# Patient Record
Sex: MALE | Race: WHITE | URBAN - METROPOLITAN AREA
[De-identification: names, ages, dates, MRNs, and addresses within clinical notes are randomized per-mention and may not be internally consistent; named-entity substitution may affect disease eponyms.]

---

## 2017-01-01 ENCOUNTER — TELEPHONE (OUTPATIENT)
Dept: FAMILY MEDICINE | Facility: CLINIC | Age: 57
End: 2017-01-01

## 2017-01-01 ENCOUNTER — MEDICAL CORRESPONDENCE (OUTPATIENT)
Dept: HEALTH INFORMATION MANAGEMENT | Facility: CLINIC | Age: 57
End: 2017-01-01

## 2017-01-01 ENCOUNTER — TRANSFERRED RECORDS (OUTPATIENT)
Dept: HEALTH INFORMATION MANAGEMENT | Facility: CLINIC | Age: 57
End: 2017-01-01

## 2017-01-01 ENCOUNTER — TELEPHONE (OUTPATIENT)
Dept: CARE COORDINATION | Facility: CLINIC | Age: 57
End: 2017-01-01

## 2017-01-01 ENCOUNTER — OFFICE VISIT (OUTPATIENT)
Dept: FAMILY MEDICINE | Facility: CLINIC | Age: 57
End: 2017-01-01
Payer: COMMERCIAL

## 2017-01-01 ENCOUNTER — DOCUMENTATION ONLY (OUTPATIENT)
Dept: LAB | Facility: CLINIC | Age: 57
End: 2017-01-01

## 2017-01-01 ENCOUNTER — CARE COORDINATION (OUTPATIENT)
Dept: CARE COORDINATION | Facility: CLINIC | Age: 57
End: 2017-01-01

## 2017-01-01 VITALS
HEART RATE: 83 BPM | SYSTOLIC BLOOD PRESSURE: 113 MMHG | WEIGHT: 183 LBS | HEIGHT: 72 IN | DIASTOLIC BLOOD PRESSURE: 89 MMHG | OXYGEN SATURATION: 99 % | BODY MASS INDEX: 24.79 KG/M2 | TEMPERATURE: 97.6 F

## 2017-01-01 VITALS
DIASTOLIC BLOOD PRESSURE: 60 MMHG | WEIGHT: 180 LBS | TEMPERATURE: 97.5 F | HEART RATE: 131 BPM | SYSTOLIC BLOOD PRESSURE: 96 MMHG | BODY MASS INDEX: 24.31 KG/M2 | OXYGEN SATURATION: 90 %

## 2017-01-01 VITALS
SYSTOLIC BLOOD PRESSURE: 116 MMHG | WEIGHT: 175 LBS | HEART RATE: 103 BPM | OXYGEN SATURATION: 98 % | TEMPERATURE: 97 F | BODY MASS INDEX: 23.64 KG/M2 | DIASTOLIC BLOOD PRESSURE: 83 MMHG

## 2017-01-01 VITALS
BODY MASS INDEX: 24.36 KG/M2 | SYSTOLIC BLOOD PRESSURE: 116 MMHG | WEIGHT: 180.38 LBS | DIASTOLIC BLOOD PRESSURE: 85 MMHG | HEART RATE: 104 BPM | OXYGEN SATURATION: 99 % | TEMPERATURE: 97.9 F

## 2017-01-01 DIAGNOSIS — Z01.818 PREOP GENERAL PHYSICAL EXAM: Primary | ICD-10-CM

## 2017-01-01 DIAGNOSIS — R53.1 WEAKNESS: ICD-10-CM

## 2017-01-01 DIAGNOSIS — Z46.89 ENCOUNTER FOR REPLACEMENT OF PANCREATIC STENT: ICD-10-CM

## 2017-01-01 DIAGNOSIS — M21.371 RIGHT FOOT DROP: ICD-10-CM

## 2017-01-01 DIAGNOSIS — E83.42 HYPOMAGNESEMIA: ICD-10-CM

## 2017-01-01 DIAGNOSIS — I25.10 CORONARY ARTERY DISEASE INVOLVING NATIVE CORONARY ARTERY OF NATIVE HEART WITHOUT ANGINA PECTORIS: ICD-10-CM

## 2017-01-01 DIAGNOSIS — I25.10 CORONARY ARTERY DISEASE WITHOUT ANGINA PECTORIS, UNSPECIFIED VESSEL OR LESION TYPE, UNSPECIFIED WHETHER NATIVE OR TRANSPLANTED HEART: Primary | ICD-10-CM

## 2017-01-01 DIAGNOSIS — C79.89: ICD-10-CM

## 2017-01-01 DIAGNOSIS — I25.10 CORONARY ARTERY DISEASE WITHOUT ANGINA PECTORIS, UNSPECIFIED VESSEL OR LESION TYPE, UNSPECIFIED WHETHER NATIVE OR TRANSPLANTED HEART: ICD-10-CM

## 2017-01-01 DIAGNOSIS — A41.9 SEVERE SEPSIS (H): Primary | ICD-10-CM

## 2017-01-01 DIAGNOSIS — R65.21 SEPTIC SHOCK (H): Primary | ICD-10-CM

## 2017-01-01 DIAGNOSIS — R11.0 NAUSEA: ICD-10-CM

## 2017-01-01 DIAGNOSIS — Z86.19 HISTORY OF SEPSIS: ICD-10-CM

## 2017-01-01 DIAGNOSIS — C34.90: ICD-10-CM

## 2017-01-01 DIAGNOSIS — A41.9 SEPTIC SHOCK (H): Primary | ICD-10-CM

## 2017-01-01 DIAGNOSIS — D64.9 ANEMIA, UNSPECIFIED TYPE: ICD-10-CM

## 2017-01-01 DIAGNOSIS — K59.00 CONSTIPATION, UNSPECIFIED CONSTIPATION TYPE: ICD-10-CM

## 2017-01-01 DIAGNOSIS — I10 HYPERTENSION GOAL BP (BLOOD PRESSURE) < 140/90: ICD-10-CM

## 2017-01-01 DIAGNOSIS — A41.51 E. COLI SEPTICEMIA (H): Primary | ICD-10-CM

## 2017-01-01 DIAGNOSIS — A41.9 BACTERIAL SEPSIS (H): Primary | ICD-10-CM

## 2017-01-01 DIAGNOSIS — R65.20 SEVERE SEPSIS (H): Primary | ICD-10-CM

## 2017-01-01 DIAGNOSIS — K83.09 ACUTE CHOLANGITIS (H): ICD-10-CM

## 2017-01-01 DIAGNOSIS — E87.6 HYPOKALEMIA: ICD-10-CM

## 2017-01-01 DIAGNOSIS — D49.6 BRAIN TUMOR (H): ICD-10-CM

## 2017-01-01 DIAGNOSIS — C34.90 METASTATIC LUNG CANCER (METASTASIS FROM LUNG TO OTHER SITE), UNSPECIFIED LATERALITY (H): Primary | ICD-10-CM

## 2017-01-01 LAB
ALBUMIN SERPL-MCNC: 3.1 G/DL (ref 3.4–5)
ALBUMIN SERPL-MCNC: 3.8 G/DL (ref 3.4–5)
ALP SERPL-CCNC: 103 U/L (ref 40–150)
ALP SERPL-CCNC: 114 U/L (ref 40–150)
ALT SERPL W P-5'-P-CCNC: 41 U/L (ref 0–70)
ALT SERPL W P-5'-P-CCNC: 72 U/L (ref 0–70)
ANION GAP SERPL CALCULATED.3IONS-SCNC: 11 MMOL/L (ref 3–14)
ANION GAP SERPL CALCULATED.3IONS-SCNC: 11 MMOL/L (ref 3–14)
AST SERPL W P-5'-P-CCNC: 16 U/L (ref 0–45)
AST SERPL W P-5'-P-CCNC: 32 U/L (ref 0–45)
BASOPHILS # BLD AUTO: 0 10E9/L (ref 0–0.2)
BASOPHILS # BLD AUTO: 0 10E9/L (ref 0–0.2)
BASOPHILS NFR BLD AUTO: 0.4 %
BASOPHILS NFR BLD AUTO: 0.4 %
BILIRUB SERPL-MCNC: 0.5 MG/DL (ref 0.2–1.3)
BILIRUB SERPL-MCNC: 0.5 MG/DL (ref 0.2–1.3)
BUN SERPL-MCNC: 14 MG/DL (ref 7–30)
BUN SERPL-MCNC: 21 MG/DL (ref 7–30)
CALCIUM SERPL-MCNC: 10.4 MG/DL (ref 8.5–10.1)
CALCIUM SERPL-MCNC: 9.4 MG/DL (ref 8.5–10.1)
CHLORIDE SERPL-SCNC: 97 MMOL/L (ref 94–109)
CHLORIDE SERPL-SCNC: 98 MMOL/L (ref 94–109)
CO2 SERPL-SCNC: 26 MMOL/L (ref 20–32)
CO2 SERPL-SCNC: 27 MMOL/L (ref 20–32)
CREAT SERPL-MCNC: 0.53 MG/DL (ref 0.72–1.25)
CREAT SERPL-MCNC: 0.7 MG/DL (ref 0.66–1.25)
CREAT SERPL-MCNC: 0.75 MG/DL (ref 0.66–1.25)
DIFFERENTIAL METHOD BLD: ABNORMAL
DIFFERENTIAL METHOD BLD: ABNORMAL
EOSINOPHIL # BLD AUTO: 0 10E9/L (ref 0–0.7)
EOSINOPHIL # BLD AUTO: 0 10E9/L (ref 0–0.7)
EOSINOPHIL NFR BLD AUTO: 0.2 %
EOSINOPHIL NFR BLD AUTO: 0.2 %
ERYTHROCYTE [DISTWIDTH] IN BLOOD BY AUTOMATED COUNT: 18.9 % (ref 10–15)
ERYTHROCYTE [DISTWIDTH] IN BLOOD BY AUTOMATED COUNT: 21.7 % (ref 10–15)
GFR SERPL CREATININE-BSD FRML MDRD: >60 ML/MIN/1.73M2
GFR SERPL CREATININE-BSD FRML MDRD: >90 ML/MIN/1.7M2
GFR SERPL CREATININE-BSD FRML MDRD: ABNORMAL ML/MIN/1.7M2
GLUCOSE SERPL-MCNC: 115 MG/DL (ref 70–99)
GLUCOSE SERPL-MCNC: 142 MG/DL (ref 70–99)
HCT VFR BLD AUTO: 28.1 % (ref 40–53)
HCT VFR BLD AUTO: 31.7 % (ref 40–53)
HGB BLD-MCNC: 10.8 G/DL (ref 13.3–17.7)
HGB BLD-MCNC: 9.3 G/DL (ref 13.3–17.7)
LYMPHOCYTES # BLD AUTO: 2.5 10E9/L (ref 0.8–5.3)
LYMPHOCYTES # BLD AUTO: 3.9 10E9/L (ref 0.8–5.3)
LYMPHOCYTES NFR BLD AUTO: 38.4 %
LYMPHOCYTES NFR BLD AUTO: 49.3 %
MAGNESIUM SERPL-MCNC: 1.5 MG/DL (ref 1.6–2.3)
MCH RBC QN AUTO: 32.4 PG (ref 26.5–33)
MCH RBC QN AUTO: 35.6 PG (ref 26.5–33)
MCHC RBC AUTO-ENTMCNC: 33.1 G/DL (ref 31.5–36.5)
MCHC RBC AUTO-ENTMCNC: 34.1 G/DL (ref 31.5–36.5)
MCV RBC AUTO: 105 FL (ref 78–100)
MCV RBC AUTO: 98 FL (ref 78–100)
MONOCYTES # BLD AUTO: 0.1 10E9/L (ref 0–1.3)
MONOCYTES # BLD AUTO: 0.9 10E9/L (ref 0–1.3)
MONOCYTES NFR BLD AUTO: 1.8 %
MONOCYTES NFR BLD AUTO: 9.1 %
NEUTROPHILS # BLD AUTO: 2.5 10E9/L (ref 1.6–8.3)
NEUTROPHILS # BLD AUTO: 5.3 10E9/L (ref 1.6–8.3)
NEUTROPHILS NFR BLD AUTO: 48.3 %
NEUTROPHILS NFR BLD AUTO: 51.9 %
PLATELET # BLD AUTO: 151 10E9/L (ref 150–450)
PLATELET # BLD AUTO: 73 10E9/L (ref 150–450)
POTASSIUM SERPL-SCNC: 3.4 MMOL/L (ref 3.5–5)
POTASSIUM SERPL-SCNC: 4 MMOL/L (ref 3.4–5.3)
POTASSIUM SERPL-SCNC: 4.7 MMOL/L (ref 3.4–5.3)
PROT SERPL-MCNC: 7.4 G/DL (ref 6.8–8.8)
PROT SERPL-MCNC: 8.1 G/DL (ref 6.8–8.8)
RBC # BLD AUTO: 2.87 10E12/L (ref 4.4–5.9)
RBC # BLD AUTO: 3.03 10E12/L (ref 4.4–5.9)
SODIUM SERPL-SCNC: 134 MMOL/L (ref 133–144)
SODIUM SERPL-SCNC: 136 MMOL/L (ref 133–144)
WBC # BLD AUTO: 10.2 10E9/L (ref 4–11)
WBC # BLD AUTO: 5.1 10E9/L (ref 4–11)

## 2017-01-01 PROCEDURE — 99214 OFFICE O/P EST MOD 30 MIN: CPT | Performed by: FAMILY MEDICINE

## 2017-01-01 PROCEDURE — 36415 COLL VENOUS BLD VENIPUNCTURE: CPT | Performed by: FAMILY MEDICINE

## 2017-01-01 PROCEDURE — 83735 ASSAY OF MAGNESIUM: CPT | Performed by: FAMILY MEDICINE

## 2017-01-01 PROCEDURE — 80053 COMPREHEN METABOLIC PANEL: CPT | Performed by: FAMILY MEDICINE

## 2017-01-01 PROCEDURE — 85025 COMPLETE CBC W/AUTO DIFF WBC: CPT | Performed by: FAMILY MEDICINE

## 2017-01-01 PROCEDURE — 99215 OFFICE O/P EST HI 40 MIN: CPT | Performed by: FAMILY MEDICINE

## 2017-01-01 RX ORDER — GABAPENTIN 300 MG/1
300 CAPSULE ORAL
COMMUNITY

## 2017-01-01 RX ORDER — LIDOCAINE/PRILOCAINE 2.5 %-2.5%
CREAM (GRAM) TOPICAL
COMMUNITY
Start: 2017-01-01

## 2017-01-01 RX ORDER — LORATADINE 10 MG/1
10 TABLET ORAL
COMMUNITY

## 2017-01-01 RX ORDER — NITROGLYCERIN 0.4 MG/1
0.4 TABLET SUBLINGUAL EVERY 5 MIN PRN
Qty: 25 TABLET | Refills: 1 | Status: SHIPPED | OUTPATIENT
Start: 2017-01-01

## 2017-01-01 RX ORDER — CYANOCOBALAMIN 1000 UG/ML
1 INJECTION, SOLUTION INTRAMUSCULAR; SUBCUTANEOUS
COMMUNITY

## 2017-01-01 RX ORDER — PROCHLORPERAZINE MALEATE 10 MG
10 TABLET ORAL
COMMUNITY

## 2017-01-01 RX ORDER — FOLIC ACID 1 MG/1
1 TABLET ORAL DAILY
COMMUNITY

## 2017-01-01 RX ORDER — PANTOPRAZOLE SODIUM 40 MG/1
40 TABLET, DELAYED RELEASE ORAL
COMMUNITY

## 2017-01-01 RX ORDER — HYDROMORPHONE HYDROCHLORIDE 2 MG/1
2 TABLET ORAL
COMMUNITY
Start: 2017-01-01

## 2017-01-01 RX ORDER — NITROGLYCERIN 0.4 MG/1
0.4 TABLET SUBLINGUAL EVERY 5 MIN PRN
Qty: 25 TABLET | Refills: 1 | Status: SHIPPED | OUTPATIENT
Start: 2017-01-01 | End: 2017-01-01

## 2017-01-01 RX ORDER — SULFAMETHOXAZOLE/TRIMETHOPRIM 800-160 MG
1 TABLET ORAL 2 TIMES DAILY
Qty: 14 TABLET | Refills: 0 | Status: SHIPPED | OUTPATIENT
Start: 2017-01-01 | End: 2017-01-01

## 2017-01-01 ASSESSMENT — PATIENT HEALTH QUESTIONNAIRE - PHQ9
SUM OF ALL RESPONSES TO PHQ QUESTIONS 1-9: 9
SUM OF ALL RESPONSES TO PHQ QUESTIONS 1-9: 21

## 2017-01-01 ASSESSMENT — PAIN SCALES - GENERAL
PAINLEVEL: NO PAIN (0)

## 2017-03-01 NOTE — LETTER
March 1, 2017    Yunier Hernandez  [address] [facility] [address]    Dear Yunier    We care about your health and have reviewed your health plan. We have reviewed your medical conditions, medication list, and lab results and are making recommendations based on this review, to better manage your health.    You are in particular need of attention regarding:  - Completing a Colon Cancer Screening (FIT) - Please complete FIT test which we can mail to you to complete and mail completed test to clinic.      Here is a list of Health Maintenance topics that are due now or due soon:  Health Maintenance Due   Topic Date Due     FIT Q1 YR (NO INBASKET)  04/13/1972     HEPATITIS C SCREENING  04/13/1980     We will be calling you in the next couple of weeks to help you schedule any appointments that are needed.  Please call us at [phone] (or use [employer]) to address the above recommendations.     Thank you for trusting [facility] and we appreciate the opportunity to serve you.  We look forward to supporting your healthcare needs in the future.    Healthy Regards,    MD Shelly Cruz CMA

## 2017-03-01 NOTE — LETTER
March 27, 2017    Yunier Hernandez  [address] [facility] [address]      Dear Yunier Hernandez,     We have tried to contact you about your health, but have been unable to reach you.  Please call us as soon as possible so we can provide you with the best care possible.  We will continue to check in with you throughout the year to complete these items of care, if you are not able to complete these items at this time.  If you would like to complete the missing items for your care, please contact us at [phone].    We recommend the following:  -complete a FIT TEST a FIT test or Fecal Immunochemical Occult Blood Test is a take home stool sample kit.  It does not replace the colonoscopy for colorectal cancer screening, but it can detect hidden bleeding in the lower colon.  It does need to be repeated every year and if a positive result is obtained, you would be referred for a colonoscopy.  If you have completed either one of these tests at another facility, please have the records sent to our clinic so that we can best coordinate your care.    Sincerely,     Your Care Team at [facility]

## 2017-03-17 NOTE — TELEPHONE ENCOUNTER
I called and left  Message for patient to call back regarding mailing him a fit test to complete.  Shelly Ahumada CMA

## 2017-03-27 NOTE — TELEPHONE ENCOUNTER
I called and left message for patient to call back regarding mailing him a fit test to complete. Final letter mailed  Shelly Ahumada CMA

## 2017-03-29 NOTE — TELEPHONE ENCOUNTER
Patient's wife Radha returned phone call to Katia. TC checked for a consent to communicate on file and we do not have one on file. Radha stated that if we are calling regarding the letter we received for patient to have FIT test that it was not recommended to patient to have done at this time. Patient was recently hospitalized for an extended period and it was determined that his cancer is back. They found cancer in the pancreas, stomach, and spine due to lung mets. Radha asked while patient was in the hospital about getting a stool sample for this FIT test and they told her that the patient has had multiple scans and this is not something that needs to be addressed right now under the circumstances. TC will also route to provider as an FYI.

## 2017-04-21 PROBLEM — C79.89: Status: ACTIVE | Noted: 2017-01-01

## 2017-04-21 PROBLEM — C34.90: Status: ACTIVE | Noted: 2017-01-01

## 2017-04-21 NOTE — NURSING NOTE
"Chief Complaint   Patient presents with     [facility] F/U     Health Maintenance       Initial /89 (BP Location: Right arm, Patient Position: Chair, Cuff Size: Adult Regular)  Pulse 83  Temp 97.6  F (36.4  C) (Oral)  Ht 6' 0.15\" (1.833 m)  Wt 183 lb (83 kg)  SpO2 99%  BMI 24.72 kg/m2 Estimated body mass index is 24.72 kg/(m^2) as calculated from the following:    Height as of this encounter: 6' 0.15\" (1.833 m).    Weight as of this encounter: 183 lb (83 kg).  Medication Reconciliation: complete   Shelly Ahumada CMA      "

## 2017-04-21 NOTE — MR AVS SNAPSHOT
"              After Visit Summary   4/21/2017    Yunier Hernandez    MRN: [id number]           Patient Information     Date Of Birth          1960        Visit Information        Provider Department      4/21/2017 4:20 PM Karson Mahmood MD [facility]        Today's Diagnoses     Coronary artery disease without angina pectoris, unspecified vessel or lesion type, unspecified whether native or transplanted heart    -  1      Care Instructions    Stay well hydrated    Good calorie and protein intake    Call with problems/ questions        Follow-ups after your visit        Who to contact     If you have questions or need follow up information about today's clinic visit or your schedule please contact [facility] directly at [phone].  Normal or non-critical lab and imaging results will be communicated to you by MyChart, letter or phone within 4 business days after the clinic has received the results. If you do not hear from us within 7 days, please contact the clinic through MyChart or phone. If you have a critical or abnormal lab result, we will notify you by phone as soon as possible.  Submit refill requests through [employer] or call your pharmacy and they will forward the refill request to us. Please allow 3 business days for your refill to be completed.          Additional Information About Your Visit        MyChart Information     [employer] lets you send messages to your doctor, view your test results, renew your prescriptions, schedule appointments and more. To sign up, go to [email or web address] . Click on \"Log in\" on the left side of the screen, which will take you to the Welcome page. Then click on \"Sign up Now\" on the right side of the page.     You will be asked to enter the access code listed below, as well as some personal information. Please follow the directions to create your username and password.     Your access code is: [email or web address]  Expires: " "2017  5:06 PM     Your access code will  in 90 days. If you need help or a new code, please call your [facility] clinic or [phone].        Care EveryWhere ID     This is your Care EveryWhere ID. This could be used by other organizations to access your [facility] medical records  [id number]        Your Vitals Were     Pulse Temperature Height Pulse Oximetry BMI (Body Mass Index)       83 97.6  F (36.4  C) (Oral) 6' 0.15\" (1.833 m) 99% 24.72 kg/m2        Blood Pressure from Last 3 Encounters:   17 113/89   16 119/83   16 110/84    Weight from Last 3 Encounters:   17 183 lb (83 kg)   16 197 lb (89.4 kg)   16 210 lb (95.3 kg)              Today, you had the following     No orders found for display         Today's Medication Changes          These changes are accurate as of: 17  5:06 PM.  If you have any questions, ask your nurse or doctor.               These medicines have changed or have updated prescriptions.        Dose/Directions    nitroglycerin 0.4 MG sublingual tablet   Commonly known as:  NITROSTAT   This may have changed:    - when to take this  - reasons to take this   Used for:  Coronary artery disease without angina pectoris, unspecified vessel or lesion type, unspecified whether native or transplanted heart   Changed by:  Karson Mahmood MD        Dose:  0.4 mg   Place 1 tablet (0.4 mg) under the tongue every 5 minutes as needed for chest pain   Quantity:  25 tablet   Refills:  1            Where to get your medicines      These medications were sent to [id number] - [place], [place] - [address] AT [facility] [address]  [address], [place] [address]     Phone:  [phone]     nitroglycerin 0.4 MG sublingual tablet                Primary Care Provider Office Phone # Fax #    Karson Mahmood [phone]       [facility] [address]  [facility] " [id number]        Thank you!     Thank you for choosing [facility]  for your care. Our goal is always to provide you with excellent care. Hearing back from our patients is one way we can continue to improve our services. Please take a few minutes to complete the written survey that you may receive in the mail after your visit with us. Thank you!             Your Updated Medication List - Protect others around you: Learn how to safely use, store and throw away your medicines at www.disposemymeds.org.          This list is accurate as of: 4/21/17  5:06 PM.  Always use your most recent med list.                   Brand Name Dispense Instructions for use    acetaminophen 325 MG tablet    TYLENOL     Take 650 mg by mouth       aspirin 81 MG chewable tablet      Take 81 mg by mouth       Dronabinol 10 MG Caps      Take 5 mg by mouth two times daily       gabapentin 300 MG capsule    NEURONTIN     Take 300 mg by mouth       HYDROmorphone 2 MG tablet    DILAUDID     Take 2 mg by mouth       lidocaine-prilocaine cream    EMLA     APPLY 1 APPLICATION TOPICALLY TO THE AFFECTED AREA AS DIRECTED       lisinopril 5 MG tablet    PRINIVIL/ZESTRIL    90 tablet    Take 1 tablet (5 mg) by mouth daily       loratadine 10 MG tablet    CLARITIN     Take 10 mg by mouth       melatonin 5 MG tablet      Take 10 mg by mouth       METAMUCIL PO      Take 1 packet by mouth 2 times daily       metoprolol 25 MG tablet    LOPRESSOR    180 tablet    Take 1 tablet (25 mg) by mouth 2 times daily       nitroglycerin 0.4 MG sublingual tablet    NITROSTAT    25 tablet    Place 1 tablet (0.4 mg) under the tongue every 5 minutes as needed for chest pain       omeprazole 40 MG capsule    priLOSEC    90 capsule    Take 40 mg by mouth daily Reported on 4/21/2017       pantoprazole 40 MG EC tablet    PROTONIX     Take 40 mg by mouth       polyethylene glycol powder    MIRALAX/GLYCOLAX     Take 17 g by mouth 2 times daily        prochlorperazine 10 MG tablet    COMPAZINE     Take 10 mg by mouth       SENEXON-S 8.6-50 MG per tablet   Generic drug:  senna-docusate     360 tablet    1-2 po bid prn constipation

## 2017-04-21 NOTE — PROGRESS NOTES
SUBJECTIVE:                                                    Yunier Hernandez is a 57 year old male who presents to clinic today for the following health issues:           Hospital Follow-up Visit:    Hospital/Nursing Home/IP Rehab Facility: [facility]  Date of Admission: 03/17/2017  Date of Discharge: 03/23/2017  Reason(s) for Admission: constipation            Problems taking medications regularly:  None       Medication changes since discharge: yes I updated in the system       Problems adhering to non-medication therapy:  None     Summary of hospitalization:  CareEverywhere information obtained and reviewed  Diagnostic Tests/Treatments reviewed.  Follow up needed: none  Other Healthcare Providers Involved in Patient s Care:         None  Update since discharge: improved.      Post Discharge Medication Reconciliation: discharge medications reconciled, continue medications without change.  Plan of care communicated with patient and family     Coding guidelines for this visit:  Type of Medical   Decision Making Face-to-Face Visit       within 7 Days of discharge Face-to-Face Visit        within 14 days of discharge   Moderate Complexity 08380 [id number]   High Complexity 78481 [id number]                 Problem list and histories reviewed & adjusted, as indicated.  Additional history: as documented         Reviewed and updated as needed this visit by clinical staff  Tobacco  Allergies  Meds  Problems  Med Hx  Surg Hx  Fam Hx  Soc Hx        Reviewed and updated as needed this visit by Provider            Just taking as needed pain meds now    Has the dilaudid    No nausea pill needed    Dilaudid 2mg    Takes one pill at night    Had been up to 3 per day     Going to meet with nutritionist    Hard to find a supplement he enjoys taste of     Senna 2 pills daily ( not senokot s )    miralax in am    Dulcolax pill 2x daily    Going to change regimen on the chemo    Has not worked since March 17     Not much heavy  lifting    Chemo every 3 weeks    To get new regimen next time    Physical Exam   Constitutional: He is oriented to person, place, and time and well-developed, well-nourished, and in no distress. No distress.   HENT:   Head: Normocephalic and atraumatic.   Eyes: Conjunctivae are normal.   Neck: Carotid bruit is not present.   Cardiovascular: Normal rate, regular rhythm, normal heart sounds and intact distal pulses.  Exam reveals no gallop and no friction rub.    No murmur heard.  Pulmonary/Chest: Effort normal and breath sounds normal. No respiratory distress. He has no wheezes. He has no rales.   Abdominal: Soft. There is no tenderness.   Musculoskeletal: He exhibits no edema.   Neurological: He is alert and oriented to person, place, and time.   Skin: He is not diaphoretic.   Psychiatric: Mood and affect normal.   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  Unfortunately patient found to have mets to pancreas.  Plan: per onc    (D49.6) Brain tumor (H)  Comment: history of treatment for this also   Plan: as above     (R11.0) Nausea  Comment: has prn med   Plan: as above     (K59.00) Constipation, unspecified constipation type  Comment: this is well controlled currently; good regimen of meds as outlined in note above   Plan: discussed in detail     (I10) Hypertension goal BP (blood pressure) < 140/90  Comment: at goal.  Patient now off lisinopril, still on beta blocker   Plan: as above     Patient can call / return to clinic as  needed       I reviewed the patient's medications, allergies, medical history, family history, and social history.    Karson Mahmood MD

## 2017-04-24 NOTE — TELEPHONE ENCOUNTER
Received message that NitroStat Rx failed to transmit to pharmacy.  Re-sent Rx.    Laurie Chicas RN  [facility], [place]

## 2017-06-19 PROBLEM — Z71.89 ADVANCED DIRECTIVES, COUNSELING/DISCUSSION: Status: ACTIVE | Noted: 2017-01-01

## 2017-06-19 NOTE — PATIENT INSTRUCTIONS
Before Your Surgery      Call your surgeon if there is any change in your health. This includes signs of a cold or flu (such as a sore throat, runny nose, cough, rash or fever).    Do not smoke, drink alcohol or take over the counter medicine (unless your surgeon or primary care doctor tells you to) for the 24 hours before and after surgery.    If you take prescribed drugs: Follow your doctor s orders about which medicines to take and which to stop until after surgery.    Eating and drinking prior to surgery: follow the instructions from your surgeon    Take a shower or bath the night before surgery. Use the soap your surgeon gave you to gently clean your skin. If you do not have soap from your surgeon, use your regular soap. Do not shave or scrub the surgery site.  Wear clean pajamas and have clean sheets on your bed.         Stay on aspirin this week    Hold meds morning of procedure except for metoprolol    We will send you lab results

## 2017-06-19 NOTE — PROGRESS NOTES
[address] [address]  [phone]  Dept: [phone]    PRE-OP EVALUATION:  Today's date: 2017    Yunier Hernandez (: 1960) presents for pre-operative evaluation assessment as requested by Dr. Sujata Jimenez.  He requires evaluation and anesthesia risk assessment prior to undergoing surgery/procedure for treatment of stent replacement .  Proposed procedure: stent replacement    Date of Surgery/ Procedure: 2017  Time of Surgery/ Procedure: 08:00 am  Hospital/Surgical Facility: [facility]  Fax number for surgical facility: [phone]  Primary Physician: Karson Mahmood  Type of Anesthesia Anticipated: General    Patient has a Health Care Directive or Living Will:  NO    1. YES - DO YOU HAVE A HISTORY OF HEART ATTACK, STROKE, STENT, BYPASS OR SURGERY ON AN ARTERY IN THE HEAD, NECK, HEART OR LEG? MI May 2015  2. YES - DO YOU EVER HAVE ANY PAIN OR DISCOMFORT IN YOUR CHEST? As above.  No current chest pain, just some stiffness.  3. NO - Do you have a history of  Heart Failure?  4. NO - Are you troubled by shortness of breath when: walking on the level, up a slight hill or at night?  5. NO - Do you currently have a cold, bronchitis or other respiratory infection?  6. YES - DO YOU HAVE A COUGH, SHORTNESS OF BREATH OR WHEEZING? No change recently in the breathing  7. YES - DO YOU SOMETIMES GET PAINS IN THE CALVES OF YOUR LEGS WHEN YOU WALK? Not changing.  Kind of a shaking sensation.  8. NO - Do you or anyone in your family have previous history of blood clots?  9. NO - Do you or does anyone in your family have a serious bleeding problem such as prolonged bleeding following surgeries or cuts?  10. NO - Have you ever had problems with anemia or been told to take iron pills?  11. NO - Have you had any abnormal blood loss such as black, tarry or bloody stools, or abnormal vaginal bleeding?  12. NO - Have you ever had a blood  transfusion?  13. NO - Have you or any of your relatives ever had problems with anesthesia?  14. NO - Do you have sleep apnea, excessive snoring or daytime drowsiness?  15. NO - Do you have any prosthetic heart valves?  16. NO - Do you have prosthetic joints?  17. NO - Is there any chance that you may be pregnant?      HPI:                                                      Brief HPI related to upcoming procedure:  Patient has a pancreatic stent placed about 3 months ago.  Now they are to replace it.    Patient had spread of the lung cancer to the pancreas.           MEDICAL HISTORY:                                                      Patient Active Problem List    Diagnosis Date Noted     Advanced directives, counseling/discussion 06/19/2017     Priority: Medium     Non-small cell carcinoma of lung metastatic to abdomen, unspecified laterality (H) 04/21/2017     Priority: Medium     History of tobacco abuse 11/11/2016     Priority: Medium     Coronary artery disease without angina pectoris, unspecified vessel or lesion type, unspecified whether native or transplanted heart 12/31/2015     Priority: Medium     Lung tumor 12/31/2015     Priority: Medium     Brain tumor (H) 12/31/2015     Priority: Medium     Major depressive disorder, recurrent episode, moderate (H) 12/16/2015     Priority: Medium     Hyperlipidemia LDL goal <70 06/26/2015     Priority: Medium     Hypertension goal BP (blood pressure) < 140/90 06/26/2015     Priority: Medium     Coronary artery disease involving native coronary artery without angina pectoris 05/29/2015     Priority: Medium      Past Medical History:   Diagnosis Date     Lung cancer (H) Dec 2015     ST elevation (STEMI) myocardial infarction (H) May 2015    Myocardial Infarction     Past Surgical History:   Procedure Laterality Date     ANGIOPLASTY  May 2015    one coronary stent     Current Outpatient Prescriptions   Medication Sig Dispense Refill     DiphenhydrAMINE HCl (BENADRYL  PO)        Simethicone (GAS-X PO)        nitroglycerin (NITROSTAT) 0.4 MG sublingual tablet Place 1 tablet (0.4 mg) under the tongue every 5 minutes as needed for chest pain 25 tablet 1     HYDROmorphone (DILAUDID) 2 MG tablet Take 2 mg by mouth       gabapentin (NEURONTIN) 300 MG capsule Take 300 mg by mouth       lidocaine-prilocaine (EMLA) cream APPLY 1 APPLICATION TOPICALLY TO THE AFFECTED AREA AS DIRECTED       loratadine (CLARITIN) 10 MG tablet Take 10 mg by mouth       melatonin 5 MG tablet Take 10 mg by mouth       pantoprazole (PROTONIX) 40 MG EC tablet Take 40 mg by mouth       prochlorperazine (COMPAZINE) 10 MG tablet Take 10 mg by mouth       metoprolol (LOPRESSOR) 25 MG tablet Take 1 tablet (25 mg) by mouth 2 times daily (Patient taking differently: Take 25 mg by mouth 2 times daily Takes 0.5 tablet twice daily) 180 tablet 1     Dronabinol 10 MG CAPS Take 5 mg by mouth two times daily       SENEXON-S 8.6-50 MG per tablet 1-2 po bid prn constipation 360 tablet 1     polyethylene glycol (MIRALAX/GLYCOLAX) powder Take 17 g by mouth 2 times daily       aspirin 81 MG chewable tablet Take 81 mg by mouth       acetaminophen (TYLENOL) 325 MG tablet Take 650 mg by mouth       lisinopril (PRINIVIL/ZESTRIL) 5 MG tablet TAKE 1 TABLET(5 MG) BY MOUTH DAILY (Patient not taking: Reported on 6/19/2017) 90 tablet 0     Psyllium (METAMUCIL PO) Take 1 packet by mouth 2 times daily     also pancreatic enzymes  Note patient not taking the lisinopril currently    OTC products: occasional over the counter sleep aid    No Known Allergies   Latex Allergy: NO    Social History   Substance Use Topics     Smoking status: Former Smoker     Types: Cigarettes     Quit date: 12/15/2015     Smokeless tobacco: Not on file     Alcohol use Yes     History   Drug Use No       REVIEW OF SYSTEMS:                                                    Constitutional, neuro, ENT, endocrine, pulmonary, cardiac, gastrointestinal, genitourinary,  musculoskeletal, integument and psychiatric systems are negative, except as otherwise noted.    No recent illnesses    On chemo regimen per oncology, ongoing    EXAM:                                                    Pulse 131  Temp 97.5  F (36.4  C) (Oral)  Wt 180 lb (81.6 kg)  SpO2 90%  BMI 24.31 kg/m2    GENERAL APPEARANCE: healthy, alert and no distress     HENT: ear canals and TM's normal and nose and mouth without ulcers or lesions     NECK: no adenopathy, no asymmetry, masses, or scars and thyroid normal to palpation     RESP: lungs clear to auscultation - no rales, rhonchi or wheezes     CV: regular rates and rhythm, normal S1 S2, no S3 or S4 and no murmur, click or rub     ABDOMEN:  soft, nontender, no HSM or masses and bowel sounds normal     MS: extremities normal- no gross deformities noted, no evidence of inflammation in joints, FROM in all extremities.     SKIN: no suspicious lesions or rashes     NEURO: Normal strength and tone, sensory exam grossly normal, mentation intact and speech normal     PSYCH: mentation appears normal. and affect normal/bright     LYMPHATICS: No axillary, cervical, or supraclavicular nodes    DIAGNOSTICS:                                                         Recent Labs   Lab Test  02/11/16   0907 12/15/15  06/24/15   0738 05/26/15  05/24/15   HGB  12.5*   --    --    --    --   14.5   PLT   --    --    --    --    --   246   NA   --    --   137  139   --    --    POTASSIUM  4.3  4.4  4.3  4.1   < >   --    CR  0.88  0.91  0.70  0.79   < >   --    A1C   --    --    --    --    --   5.7    < > = values in this interval not displayed.      Labs drawn, pending    IMPRESSION:                                                    Reason for surgery/procedure: needs pancreatic stent changed  Diagnosis/reason for consult: pre-op clearance     The proposed surgical procedure is considered INTERMEDIATE risk.    REVISED CARDIAC RISK INDEX  The patient has the following serious  cardiovascular risks for perioperative complications such as (MI, PE, VFib and 3  AV Block):  Coronary Artery Disease (MI, positive stress test, angina, Qs on EKG)  INTERPRETATION: 1 risks: Class II (low risk - 0.9% complication rate)    The patient has the following additional risks for perioperative complications:  No identified additional risks      ICD-10-CM    1. Preop general physical exam Z01.818        RECOMMENDATIONS:                                                          --Patient is to take all scheduled medications on the day of surgery EXCEPT for modifications listed below.    Patient will hold the morning meds day of procedure except he will take the metoprolol 1/2 pill that am.    Given his heart history ( coronary stent 2 years ago ) and the presence of cancer ( hence increase clot risk ) prudent to stay on the 81 mg aspirin daily for the week prior.  Also don't anticipate much bleeding with this procedure.    Patient getting chemo every 3 weeks and he had it last week, so he is getting this procedure FDC between the chemo sessions.    Patient has history of coronary artery disease with the single coronary stent.  Has been stable.  Followed by cardiology.  No new cardiac type symptoms.  No heart testing needed here.    Note patient's blood pressure is low, 96/60 today.    He had recently been taken off the lisinopril and is just taking the 1/2 pill metoprolol 2x daily.     APPROVAL GIVEN to proceed with proposed procedure, without further diagnostic evaluation       Signed Electronically by: Karson Mahmood MD    Copy of this evaluation report is provided to requesting physician.    [facility] Preop Guidelines

## 2017-06-19 NOTE — TELEPHONE ENCOUNTER
Reason for Call:  Form, our goal is to have forms completed with 72 hours, however, some forms may require a visit or additional information.    Type of letter, form or note:  medical    Who is the form from?: Patient    Where did the form come from: Patient or family brought in       What clinic location was the form placed at?: [facility]    Where the form was placed: 's Box    What number is listed as a contact on the form?:  [phone]       Additional comments:  Please fax to [phone]    Call taken on 6/19/2017 at 4:12 PM by Liss Rojas

## 2017-06-19 NOTE — LETTER
[facility]  [address] [place]  [address]  [phone]        June 21, 2017    Yunier Hernandez  [address] [address]        Dear Yunier,    Here are your pre-op labs.  The red blood count ( hemoglobin ) is low, indicating anemia.  This may be related to your cancer and chemotherapy.     Results for orders placed or performed in visit on 06/19/17   Comprehensive metabolic panel   Result Value Ref Range    Sodium 136 133 - 144 mmol/L    Potassium 4.7 3.4 - 5.3 mmol/L    Chloride 98 94 - 109 mmol/L    Carbon Dioxide 27 20 - 32 mmol/L    Anion Gap 11 3 - 14 mmol/L    Glucose 115 (H) 70 - 99 mg/dL    Urea Nitrogen 14 7 - 30 mg/dL    Creatinine 0.70 0.66 - 1.25 mg/dL    GFR Estimate >90  Non  GFR Calc   >60 mL/min/1.7m2    GFR Estimate If Black >90   GFR Calc   >60 mL/min/1.7m2    Calcium 10.4 (H) 8.5 - 10.1 mg/dL    Bilirubin Total 0.5 0.2 - 1.3 mg/dL    Albumin 3.8 3.4 - 5.0 g/dL    Protein Total 8.1 6.8 - 8.8 g/dL    Alkaline Phosphatase 103 40 - 150 U/L    ALT 41 0 - 70 U/L    AST 32 0 - 45 U/L   CBC with platelets differential   Result Value Ref Range    WBC 5.1 4.0 - 11.0 10e9/L    RBC Count 2.87 (L) 4.4 - 5.9 10e12/L    Hemoglobin 9.3 (L) 13.3 - 17.7 g/dL    Hematocrit 28.1 (L) 40.0 - 53.0 %    MCV 98 78 - 100 fl    MCH 32.4 26.5 - 33.0 pg    MCHC 33.1 31.5 - 36.5 g/dL    RDW 18.9 (H) 10.0 - 15.0 %    Platelet Count 151 150 - 450 10e9/L    Diff Method Automated Method     % Neutrophils 48.3 %    % Lymphocytes 49.3 %    % Monocytes 1.8 %    % Eosinophils 0.2 %    % Basophils 0.4 %    Absolute Neutrophil 2.5 1.6 - 8.3 10e9/L    Absolute Lymphocytes 2.5 0.8 - 5.3 10e9/L    Absolute Monocytes 0.1 0.0 - 1.3 10e9/L    Absolute Eosinophils 0.0 0.0 - 0.7 10e9/L    Absolute Basophils 0.0 0.0 - 0.2 10e9/L       If you have any questions please call the clinic at [phone].    Sincerely,    Karson BALDWINL

## 2017-06-19 NOTE — LETTER
My Depression Action Plan  Name: Yunier Hernandez   Date of Birth 1960  Date: 6/19/2017    My doctor: Karson Mahmood   My clinic: [address] [id number] [phone]          GREEN    ZONE   Good Control    What it looks like:     Things are going generally well. You have normal up s and down s. You may even feel depressed from time to time, but bad moods usually last less than a day.   What you need to do:  1. Continue to care for yourself (see self care plan)  2. Check your depression survival kit and update it as needed  3. Follow your physician s recommendations including any medication.  4. Do not stop taking medication unless you consult with your physician first.           YELLOW         ZONE Getting Worse    What it looks like:     Depression is starting to interfere with your life.     It may be hard to get out of bed; you may be starting to isolate yourself from others.    Symptoms of depression are starting to last most all day and this has happened for several days.     You may have suicidal thoughts but they are not constant.   What you need to do:     1. Call your care team, your response to treatment will improve if you keep your care team informed of your progress. Yellow periods are signs an adjustment may need to be made.     2. Continue your self-care, even if you have to fake it!    3. Talk to someone in your support network    4. Open up your depression survival kit           RED    ZONE Medical Alert - Get Help    What it looks like:     Depression is seriously interfering with your life.     You may experience these or other symptoms: You can t get out of bed most days, can t work or engage in other necessary activities, you have trouble taking care of basic hygiene, or basic responsibilities, thoughts of suicide or death that will not go away, self-injurious behavior.     What you need to  do:  1. Call your care team and request a same-day appointment. If they are not available (weekends or after hours) call your local crisis line, emergency room or 911.      Electronically signed by: Karson Mahmood, June 19, 2017    Depression Self Care Plan / Survival Kit    Self-Care for Depression  Here s the deal. Your body and mind are really not as separate as most people think.  What you do and think affects how you feel and how you feel influences what you do and think. This means if you do things that people who feel good do, it will help you feel better.  Sometimes this is all it takes.  There is also a place for medication and therapy depending on how severe your depression is, so be sure to consult with your medical provider and/ or Behavioral Health Consultant if your symptoms are worsening or not improving.     In order to better manage my stress, I will:    Exercise  Get some form of exercise, every day. This will help reduce pain and release endorphins, the  feel good  chemicals in your brain. This is almost as good as taking antidepressants!  This is not the same as joining a gym and then never going! (they count on that by the way ) It can be as simple as just going for a walk or doing some gardening, anything that will get you moving.      Hygiene   Maintain good hygiene (Get out of bed in the morning, Make your bed, Brush your teeth, Take a shower, and Get dressed like you were going to work, even if you are unemployed).  If your clothes don't fit try to get ones that do.    Diet  I will strive to eat foods that are good for me, drink plenty of water, and avoid excessive sugar, caffeine, alcohol, and other mood-altering substances.  Some foods that are helpful in depression are: complex carbohydrates, B vitamins, flaxseed, fish or fish oil, fresh fruits and vegetables.    Psychotherapy  I agree to participate in Individual Therapy (if recommended).    Medication  If prescribed medications, I  agree to take them.  Missing doses can result in serious side effects.  I understand that drinking alcohol, or other illicit drug use, may cause potential side effects.  I will not stop my medication abruptly without first discussing it with my provider.    Staying Connected With Others  I will stay in touch with my friends, family members, and my primary care provider/team.    Use your imagination  Be creative.  We all have a creative side; it doesn t matter if it s oil painting, sand castles, or mud pies! This will also kick up the endorphins.    Witness Beauty  (AKA stop and smell the roses) Take a look outside, even in mid-winter. Notice colors, textures. Watch the squirrels and birds.     Service to others  Be of service to others.  There is always someone else in need.  By helping others we can  get out of ourselves  and remember the really important things.  This also provides opportunities for practicing all the other parts of the program.    Humor  Laugh and be silly!  Adjust your TV habits for less news and crime-drama and more comedy.    Control your stress  Try breathing deep, massage therapy, biofeedback, and meditation. Find time to relax each day.     My support system    Clinic Contact:  Phone number:    Contact 1:  Phone number:    Contact 2:  Phone number:    Zoroastrianism/:  Phone number:    Therapist:  Phone number:    Local crisis center:    Phone number:    Other community support:  Phone number:

## 2017-06-19 NOTE — MR AVS SNAPSHOT
After Visit Summary   6/19/2017    Yunier Hernandez    MRN: [id number]           Patient Information     Date Of Birth          1960        Visit Information        Provider Department      6/19/2017 3:00 PM Karson Mahmood MD [facility]        Today's Diagnoses     Preop general physical exam    -  1    Encounter for replacement of pancreatic stent          Care Instructions      Before Your Surgery      Call your surgeon if there is any change in your health. This includes signs of a cold or flu (such as a sore throat, runny nose, cough, rash or fever).    Do not smoke, drink alcohol or take over the counter medicine (unless your surgeon or primary care doctor tells you to) for the 24 hours before and after surgery.    If you take prescribed drugs: Follow your doctor s orders about which medicines to take and which to stop until after surgery.    Eating and drinking prior to surgery: follow the instructions from your surgeon    Take a shower or bath the night before surgery. Use the soap your surgeon gave you to gently clean your skin. If you do not have soap from your surgeon, use your regular soap. Do not shave or scrub the surgery site.  Wear clean pajamas and have clean sheets on your bed.         Stay on aspirin this week    Hold meds morning of procedure except for metoprolol    We will send you lab results            Follow-ups after your visit        Who to contact     If you have questions or need follow up information about today's clinic visit or your schedule please contact [facility] directly at [phone].  Normal or non-critical lab and imaging results will be communicated to you by MyChart, letter or phone within 4 business days after the clinic has received the results. If you do not hear from us within 7 days, please contact the clinic through MyChart or phone. If you have a critical or abnormal lab result, we will notify you by  "phone as soon as possible.  Submit refill requests through [employer] or call your pharmacy and they will forward the refill request to us. Please allow 3 business days for your refill to be completed.          Additional Information About Your Visit        [employer]anx Information     [employer] lets you send messages to your doctor, view your test results, renew your prescriptions, schedule appointments and more. To sign up, go to [email or web address] . Click on \"Log in\" on the left side of the screen, which will take you to the Welcome page. Then click on \"Sign up Now\" on the right side of the page.     You will be asked to enter the access code listed below, as well as some personal information. Please follow the directions to create your username and password.     Your access code is: [email or web address]  Expires: 2017  5:06 PM     Your access code will  in 90 days. If you need help or a new code, please call your [facility] clinic or [phone].        Care EveryWhere ID     This is your Care EveryWhere ID. This could be used by other organizations to access your [facility] medical records  [id number]        Your Vitals Were     Pulse Temperature Pulse Oximetry BMI (Body Mass Index)          131 97.5  F (36.4  C) (Oral) 90% 24.31 kg/m2         Blood Pressure from Last 3 Encounters:   17 113/89   16 119/83   16 110/84    Weight from Last 3 Encounters:   17 180 lb (81.6 kg)   17 183 lb (83 kg)   16 197 lb (89.4 kg)              We Performed the Following     CBC with platelets differential     Comprehensive metabolic panel     DEPRESSION ACTION PLAN (DAP)          Today's Medication Changes          These changes are accurate as of: 17  4:00 PM.  If you have any questions, ask your nurse or doctor.               These medicines have changed or have updated prescriptions.        Dose/Directions    metoprolol 25 MG tablet   Commonly known as:  LOPRESSOR   This may have changed: "  additional instructions   Used for:  Essential hypertension with goal blood pressure less than 140/90        Dose:  25 mg   Take 1 tablet (25 mg) by mouth 2 times daily   Quantity:  180 tablet   Refills:  1                Primary Care Provider Office Phone # Fax #    Karson Mahmood [phone]       [facility] [address]  [facility] [address]        Thank you!     Thank you for choosing [facility]  for your care. Our goal is always to provide you with excellent care. Hearing back from our patients is one way we can continue to improve our services. Please take a few minutes to complete the written survey that you may receive in the mail after your visit with us. Thank you!             Your Updated Medication List - Protect others around you: Learn how to safely use, store and throw away your medicines at www.disposemymeds.org.          This list is accurate as of: 6/19/17  4:00 PM.  Always use your most recent med list.                   Brand Name Dispense Instructions for use    acetaminophen 325 MG tablet    TYLENOL     Take 650 mg by mouth       aspirin 81 MG chewable tablet      Take 81 mg by mouth       BENADRYL PO          Dronabinol 10 MG Caps      Take 5 mg by mouth two times daily       gabapentin 300 MG capsule    NEURONTIN     Take 300 mg by mouth       GAS-X PO          HYDROmorphone 2 MG tablet    DILAUDID     Take 2 mg by mouth       lidocaine-prilocaine cream    EMLA     APPLY 1 APPLICATION TOPICALLY TO THE AFFECTED AREA AS DIRECTED       lisinopril 5 MG tablet    PRINIVIL/ZESTRIL    90 tablet    TAKE 1 TABLET(5 MG) BY MOUTH DAILY       loratadine 10 MG tablet    CLARITIN     Take 10 mg by mouth       melatonin 5 MG tablet      Take 10 mg by mouth       METAMUCIL PO      Take 1 packet by mouth 2 times daily       metoprolol 25 MG tablet    LOPRESSOR    180 tablet    Take 1 tablet (25 mg) by mouth 2 times daily       nitroglycerin  0.4 MG sublingual tablet    NITROSTAT    25 tablet    Place 1 tablet (0.4 mg) under the tongue every 5 minutes as needed for chest pain       pantoprazole 40 MG EC tablet    PROTONIX     Take 40 mg by mouth       polyethylene glycol powder    MIRALAX/GLYCOLAX     Take 17 g by mouth 2 times daily       prochlorperazine 10 MG tablet    COMPAZINE     Take 10 mg by mouth       SENEXON-S 8.6-50 MG per tablet   Generic drug:  senna-docusate     360 tablet    1-2 po bid prn constipation

## 2017-06-19 NOTE — NURSING NOTE
"Chief Complaint   Patient presents with     Pre-Op Exam       Initial Pulse 131  Temp 97.5  F (36.4  C) (Oral)  Wt 180 lb (81.6 kg)  SpO2 90%  BMI 24.31 kg/m2 Estimated body mass index is 24.31 kg/(m^2) as calculated from the following:    Height as of 4/21/17: 6' 0.15\" (1.833 m).    Weight as of this encounter: 180 lb (81.6 kg).  Medication Reconciliation: complete   Shelly Ahumada CMA      "

## 2017-06-20 NOTE — TELEPHONE ENCOUNTER
Date forms received: 6-20-17  Form completed as much as possible by Debra Rowe.  Forms placed: provider desk Date placed: 6-20-17  Debra Rowe

## 2017-06-21 NOTE — PROGRESS NOTES
Here are your pre-op labs.  The red blood count ( hemoglobin ) is low, indicating anemia.  This may be related to your cancer and chemotherapy.    Karson Mahmood MD

## 2017-06-26 NOTE — TELEPHONE ENCOUNTER
This is a request for records.  Form given to [facility] to process.  Closing encounter.

## 2017-07-31 NOTE — TELEPHONE ENCOUNTER
Panel Management Review      Patient has the following on his problem list:     Depression / Dysthymia review  PHQ-9 SCORE 5/19/2016 11/11/2016 6/19/2017   Total Score 1 4 21      Patient is due for:  None      IVD   ASA: Passed    Last LDL:    Lab Results   Component Value Date    CHOL 182 11/08/2016     Lab Results   Component Value Date    HDL 40 11/08/2016     Lab Results   Component Value Date     11/08/2016     Lab Results   Component Value Date    TRIG 125 11/08/2016        Lab Results   Component Value Date    CHOLHDLRATIO 4.2 11/17/2015        Is the patient on a Statin? NO   Is the patient on Aspirin? YES                  Medications     Salicylates    aspirin 81 MG chewable tablet          Last three blood pressure readings:  BP Readings from Last 3 Encounters:   06/19/17 96/60   04/21/17 113/89   11/11/16 119/83        Tobacco History:     History   Smoking Status     Former Smoker     Types: Cigarettes     Quit date: 12/15/2015   Smokeless Tobacco     Not on file       Hypertension   Last three blood pressure readings:  BP Readings from Last 3 Encounters:   06/19/17 96/60   04/21/17 113/89   11/11/16 119/83     Blood pressure: Passed    HTN Guidelines:  Age 18-59 BP range:  Less than 140/90  Age 60-85 with Diabetes:  Less than 140/90  Age 60-85 without Diabetes:  less than 150/90            Composite cancer screening  Chart review shows that this patient is due/due soon for the following Fecal Colorectal (FIT)  Summary:    Patient is due/failing the following:   FIT    Action needed:   Patient needs referral/order: fit test    Type of outreach:    see panel managment message from March    Questions for provider review:    None                                                                                                                                    Shelly Ahumada [facility]       Chart routed to chart closed .

## 2017-08-23 NOTE — PROGRESS NOTES
Clinic Care Coordination Contact  OUTREACH    Referral Information:  Referral Source: [facility]  Reason for Contact: post hospital for the patient in [facility] for 5 days with sepsis.  The patient is oncology patient with multiple metastasis sites as well as cardiovascular problems.  Transplanted heart.  Care Conference: No     Universal Utilization:   ED Visits in last year: 2  Hospital visits in last year: 2  Last PCP appointment: 06/19/17  Missed Appointments: 0  Concerns: sepsis  Multiple Providers or Specialists: FP, cardio, oncology    Clinical Concerns:  Current Medical Concerns:   Coronary artery disease involving native coronary artery without angina pectoris   Hyperlipidemia LDL goal <70   Hypertension goal BP (blood pressure) < 140/90   Major depressive disorder, recurrent episode, moderate (H)   Coronary artery disease without angina pectoris, unspecified vessel or lesion type, unspecified whether native or transplanted heart   Lung tumor   Brain tumor (H)   History of tobacco abuse   Non-small cell carcinoma of lung metastatic to abdomen, unspecified laterality (H)   Advanced directives, counseling/discussion         Current Behavioral Concerns: depression    Education Provided to patient: Importance of care coordination and follow up with PCP.   Clinical Pathway Name: None  Clinical Pathway: None    Medication Management:  Reviewed and the patient has no questions or concerns.     Functional Status:  Mobility Status: Independent  Equipment Currently Used at Home: none  Transportation: Self  although usually the spouse brings him to appointments.           Psychosocial:  Current living arrangement:: I live in a private home with spouse  Financial/Insurance: [employer]       Resources and Interventions:  Current Resources:  (n/a);  (n/a)  PAS Number:  (n/a)  Senior Linkage Line Referral Placed:  (n/a)  Advanced Care Plans/Directives on file:: No  Referrals Placed:  (n/a)    Patient/Caregiver  understanding: The patient and his spouse have a very good understanding of the disease process.  Frequency of Care Coordination: 2 weeks  Upcoming appointment: 09/20/17     Plan: 1).  The patient will make and attend all follow up appointments with the PCP and the specialists.  2).  The patient will work to get enough rest and fluids to promote healing.  3).  The patient will call with any questions or concerns that may arise.  4).  Care Coordination RN will make a follow up call to the patient again in 2 weeks.  5).  Care Coordination to remain available for the patient to contact in the event of future needs.        Steve Mckeon, MSN, RN, PHN  [facility] Clinic Care Coordinator  [facility]  Phone: [phone]  [email or web address]

## 2017-08-28 NOTE — TELEPHONE ENCOUNTER
"Red flag call taken, wife Radha is on the phone with Yunier in the background.   States patient was taken by ambulance to [facility] 8/18/17 with high fever, admitted for treatment of sepsis.   DC to home last Tuesday (6 days ago) on ciprofloxacin, no other antibiotics.  I see patient is oncology patient with history of heart transplant.  Radha states he has been on the cipro twice a day since discharge for E Coli sepsis.   Yesterday he took his AM dose, then developed nausea, vomited 3 times yesterday.  Skipped the evening dose as advised by \"the hospital\" and ate lightly; was advised to call PCP today for plan.   She states he is now having itching all over, no rash or hives.   No facial, lip, or tongue swelling.  Is able to talk, swallow, eat and drink.   No more emesis.   Denies trouble breathing.  He took a benadryl just now.       She states they were not told they needed to be seen for follow up; he does have appt end of September for another pre-op.   Has not been seen by Dr. Mahmood since last pre-op in June.    Advised they call 911 if he develops facial swelling, trouble breathing or swallowing, high fever, or signs of dehydration.    Radha verbalized understanding.     Source:  Telephone Triage Protocols for Nurses, [place], 5th ed, allergic reaction    Pharmacy selected.    Routed to Dr. Mahmood to advise on alternate Rx (he should have taken the cipro for 4 more days per Radha's report) or other follow up plan.    Miri Mtz RN  [facility]      "

## 2017-08-28 NOTE — TELEPHONE ENCOUNTER
Reason for call:  Patient reporting a symptom    Symptom or request: Possible allergic reaction     Duration (how long have symptoms been present): Today     Have you been treated for this before? No    Additional comments: -    Phone Number patient can be reached at:  Home number on file [phone] (home)    Best Time:  Anytime    Can we leave a detailed message on this number:  YES    Call taken on 8/28/2017 at 8:32 AM by Velia Ahumada

## 2017-08-28 NOTE — TELEPHONE ENCOUNTER
I called and discussed in detail with patient.  We will treat with tmp/sulfa for at least 5 days. Dc cipro.   I did check hospital labs and the e coli is sensitive to several other antibiotics.  Follow up with problems/ questions.    See us for preop as planned.    Karson Mahmood MD

## 2017-09-07 NOTE — PROGRESS NOTES
Clinic Care Coordination Contact  [facility]/Voicemail    Referral Source: Non-[facility]  Clinical Data: Care Coordinator Outreach  Outreach attempted x 1.  Left message on voicemail with call back information and requested return call.  Plan: Care Coordinator mailed out care coordination introduction letter on 8-23-17. Care Coordinator will try to reach patient again in 3-5 business days.      Steve Mckeon, MSN, RN, PHN  N Clinic Care Coordinator  [facility]  Phone: [phone]  [email or web address]

## 2017-09-08 NOTE — PROGRESS NOTES
Clinic Care Coordination Contact  Care Team Conversations    The patient's wife called back to speak with the Care Coordination RN as the patient is hospitalized at [facility].  The Care Coordination RN does not have a consent to communicate on file so no information will be given out to the spouse.   The patient was recently hospitalized and sent home on antibiotics which had to be changed by the PCP.  The patient developed itching and jaundice for which he was seen in the ED again and admitted.  He has received 3 units of blood transfused.  The pancreatic stent has been replaced.  A repeat MRI was performed which showed that the cancer has returned to his brain.  The plan is to begin radiation treatments on the brain again.  The patient will plan on coming in to see the PCP following discharge.  The Care Coordination RN will contact the patient and his spouse in 2-3 weeks for a follow up.      Plan: 1).  The patient will see the PCP following discharge from [facility].    2).  The patient will report and untoward side effects to the providers as soon as they arise.  3).  Care Coordination RN will make a follow up call to the patient and his spouse in 2-3 weeks.  4).  Care Coordination to remain available for the patient to contact in the event of future needs.      Steve Mckeon, MSN, RN, PHN  N Clinic Care Coordinator  [facility]  Phone: [phone]  [email or web address]

## 2017-09-12 NOTE — LETTER
Health Care Home - Access Care Plan    About Me  Patient Name:  Yunier Hernandez    YOB: 1960  Age:                            57 year old   Carlita MRN:         [id number] Telephone Information:     Home Phone [phone]   Mobile [phone]       Address:    [address] [address] Email address:  No e-mail address on record      Emergency Contact(s)  Name Relationship Lgl Grd Work Phone Home Phone Mobile Phone   1. ROMERO,BET* Spouse   [phone]    2. NO SECONDARY C*    [phone]              Health Maintenance: Routine Health maintenance Reviewed: Due/Overdue     My Access Plan  Medical Emergency 911   Questions or concerns during clinic hours Primary Clinic Line, I will call the clinic directly: Primary Clinic: [facility]- [phone]   24 Hour Appointment Line [phone] or  [phone])  (toll free)   24 Hour Nurse Line [phone] (toll free)   Questions or concerns outside clinic hours 24 Hour Appointment Line, I will call the after-hours on-call line:   [facility] [phone] or [phone] ([phone]) (toll-free)   Preferred Urgent Care     Preferred Hospital Preferred Hospital: [facility]  [phone]   Preferred Pharmacy [id number] - [place], [place] - [address]     Behavioral Health Crisis Line The National Suicide Prevention Lifeline at 1-266.165.8068 or 919     My Care Team Members  Patient Care Team       Relationship Specialty Notifications Start End    Karson Mahmood MD PCP - General   7/17/09     Phone: [phone] Fax: [phone]         [address] [facility] [address]    Ilana Davenport MD Resident Hematology & Oncology  12/31/15     Phone: [phone] Fax: [phone]         [facility] [address] [facility] [address]    Seth Wilson MD     12/31/15     Phone:  [phone] Fax: [phone]         [facility] NEUROLOGY [address]    [facility] [address]    Cody Woodruff   Neurosurgery  12/31/15     Phone: [phone] Fax: [phone]         [facility] NEUROSURGERY [address] [facility] [address]    Steve Santos, RN Clinic Care Coordinator Nurse Admissions 8/23/17     Comment:  Phone:  [phone]        My Medical and Care Information  Problem List   Patient Active Problem List   Diagnosis     Coronary artery disease involving native coronary artery without angina pectoris     Hyperlipidemia LDL goal <70     Hypertension goal BP (blood pressure) < 140/90     Major depressive disorder, recurrent episode, moderate (H)     Coronary artery disease without angina pectoris, unspecified vessel or lesion type, unspecified whether native or transplanted heart     Lung tumor     Brain tumor (H)     History of tobacco abuse     Non-small cell carcinoma of lung metastatic to abdomen, unspecified laterality (H)     Advanced directives, counseling/discussion

## 2017-09-12 NOTE — LETTER
[facility]   [address] [place] [address]  [phone]    September 12, 2017    Yunier Hernandez  [address] [address]    Dear Yunier,  I am the Clinic Care Coordinator that works with your primary care provider's clinic. I wanted to thank you for spending the time to talk with me.  Below is a description of what Clinic Care Coordination is and how I can further assist you.     The Clinic Care Coordinator role is a Registered Nurse and/or  who understands the health care system. The goal of Clinic Care Coordination is to help you manage your health and improve access to the [facility] in the most efficient manner.  The Registered Nurse can assist you in meeting your health care goals by providing education, coordinating services, and strengthening the communication among your providers. The  can assist you with financial, behavioral, psychosocial, and chemical dependency and counseling/psychiatric resources.    Please feel free to keep this letter and contact information to contact me at [phone] with any further questions or concerns that may arise. We at [facility] are focused on providing you with the highest-quality healthcare experience possible and that all starts with you.       Sincerely,     Steve Mckeon, MSN, RN, PHN  N Clinic Care Coordinator  [facility]  Phone: [phone]  [email or web address]      Enclosed: I have enclosed a copy of a 24 Hour Access Plan. This has helpful phone numbers for you to call when needed. Please keep this in an easy to access place to use as needed.

## 2017-09-12 NOTE — PROGRESS NOTES
Clinic Care Coordination Contact  [facility]/Voicemail    Referral Source: Non-[facility]  Clinical Data: Care Coordinator Outreach  Outreach attempted x 1.  Left message on voicemail with call back information and requested return call.  Plan: Care Coordinator will mail out care coordination introduction letter with care coordinator contact information and explanation of care coordination services. Care Coordinator will try to reach patient again in 1-2 business days.      Steve Mckeon, MSN, RN, PHN  N Clinic Care Coordinator  [facility]  Phone: [phone]  [email or web address]

## 2017-09-13 NOTE — TELEPHONE ENCOUNTER
Forms received from: [employer]   Phone number listed: [phone]   Fax listed: [phone]  Date received: 9-13-17  Form description: ability assessment   Once forms are completed, please return to [employer] via fax.  Is patient requesting to be contacted when forms are completed: n/a  Form placed: provider desk  Debra Rowe

## 2017-09-14 PROBLEM — I25.10 CORONARY ARTERY DISEASE INVOLVING NATIVE CORONARY ARTERY OF NATIVE HEART WITHOUT ANGINA PECTORIS: Status: ACTIVE | Noted: 2017-01-01

## 2017-09-14 NOTE — NURSING NOTE
"Chief Complaint   Patient presents with     [facility] F/U     Health Maintenance       Initial /83 (BP Location: Right arm, Patient Position: Chair, Cuff Size: Adult Regular)  Pulse 103  Temp 97  F (36.1  C) (Oral)  Wt 175 lb (79.4 kg)  SpO2 98%  BMI 23.64 kg/m2 Estimated body mass index is 23.64 kg/(m^2) as calculated from the following:    Height as of 4/21/17: 6' 0.15\" (1.833 m).    Weight as of this encounter: 175 lb (79.4 kg).  Medication Reconciliation: complete   Shelly Ahumada CMA      "

## 2017-09-14 NOTE — PROGRESS NOTES
SUBJECTIVE:   Yunier Hernandez is a 57 year old male who presents to clinic today for the following health issues:           Hospital Follow-up Visit:    Hospital[facility]Nursing Home/IP Rehab [facility]: [facility]  Date of Admission: 09/04/2017  Date of Discharge: 09/11/2017  Reason(s) for Admission: sepsis            Problems taking medications regularly:  None       Medication changes since discharge: None       Problems adhering to non-medication therapy:  None     Summary of hospitalization:  CareEverywhere information obtained and reviewed  Diagnostic Tests/Treatments reviewed.  Follow up needed: none  Other Healthcare Providers Involved in Patient s Care:         [facility]  Update since discharge: improved.      Post Discharge Medication Reconciliation: discharge medications reconciled, continue medications without change.  Plan of care communicated with patient     Coding guidelines for this visit:  Type of Medical   Decision Making Face-to-Face Visit       within 7 Days of discharge Face-to-Face Visit        within 14 days of discharge   Moderate Complexity 83628 [id number]   High Complexity 74276 52750                   Problem list and histories reviewed & adjusted, as indicated.  Additional history: as documented         Reviewed and updated as needed this visit by clinical staff       Reviewed and updated as needed this visit by Provider          reviewed dc summary in detail    To get another ercp with stent replacement in 2 months    Got 2 units of blood    New brain lesions on mri    Getting radiation on brain, 1st dose yest    Patient does not want plain tylenol    Nauseated in am    Not really vomiting    Bowels okay      No bleeding    Radiation to be daily for 10 days but not on weekend      Physical Exam   Constitutional: He is oriented to person, place, and time and well-developed, well-nourished, and in no distress. No distress.   HENT:   Head: Normocephalic and atraumatic.   Eyes: Conjunctivae are normal.   Neck:  Carotid bruit is not present.   Cardiovascular: Normal rate, regular rhythm, normal heart sounds and intact distal pulses.  Exam reveals no gallop and no friction rub.    No murmur heard.  Pulmonary/Chest: Effort normal and breath sounds normal. No respiratory distress. He has no wheezes. He has no rales.   Abdominal: Soft. Bowel sounds are normal. He exhibits no distension and no mass. There is no tenderness. There is no rebound and no guarding.   Musculoskeletal: He exhibits no edema.   Neurological: He is alert and oriented to person, place, and time.   Skin: He is not diaphoretic.   Psychiatric: Mood and affect normal.   multiple excoriations present on skin but appear to mostly healing up  Not really jaundiced here    ASSESSMENT / PLAN:  (C34.90) Metastatic lung cancer (metastasis from lung to other site), unspecified laterality (H)  (primary encounter diagnosis)  Comment: patient under care of oncology and radiation oncology.   Getting radiation currently, may be getting back on chemo soon.   Plan: as above.  Unfortunately scans from hospital showed progression of disease.     (K83.0) Acute cholangitis  Comment: jaundice has resolved, had the biliary stent replaced in hospital.  Plan: monitor     (Z86.19) History of sepsis  Comment: on antibiotics   Plan: finish these out    (R53.1) Weakness  Comment: overall very weak.  Not able to work in months  Plan: completed forms for work for patient. See in chart. Given recent hospitalization and underlying illness he will not be able to return to work, likely ever.     (I10) Hypertension goal BP (blood pressure) < 140/90  Comment: at goal   Plan: no change     (I25.10) Coronary artery disease involving native coronary artery of native heart without angina pectoris  Comment: no current angina  Plan: monitor      I reviewed the patient's medications, allergies, medical history, family history, and social history.    Karson Mahmood MD

## 2017-09-14 NOTE — MR AVS SNAPSHOT
"              After Visit Summary   9/14/2017    Yunier Hernandez    MRN: [id number]           Patient Information     Date Of Birth          1960        Visit Information        Provider Department      9/14/2017 8:40 AM Karson Mahmood MD [facility]        Today's Diagnoses     Metastatic lung cancer (metastasis from lung to other site), unspecified laterality (H)    -  1      Care Instructions    Increase fluid intake and calorie intake    Call/ return to clinic with problems/ questions          Follow-ups after your visit        Your next 10 appointments already scheduled     Sep 20, 2017  3:20 PM CDT   Pre-Op physical with [facility]ood [facility]   [facility]    [address] [place] [id number] [phone]              Who to contact     If you have questions or need follow up information about today's clinic visit or your schedule please contact [facility] directly at [phone].  Normal or non-critical lab and imaging results will be communicated to you by MyChart, letter or phone within 4 business days after the clinic has received the results. If you do not hear from us within 7 days, please contact the clinic through [employer] or phone. If you have a critical or abnormal lab result, we will notify you by phone as soon as possible.  Submit refill requests through RECESS. or call your pharmacy and they will forward the refill request to us. Please allow 3 business days for your refill to be completed.          Additional Information About Your Visit        [employer] Information     RECESS. lets you send messages to your doctor, view your test results, renew your prescriptions, schedule appointments and more. To sign up, go to [email or web address] . Click on \"Log in\" on the left side of the screen, which will take you to the Welcome page. Then click on \"Sign up Now\" on " the right side of the page.     You will be asked to enter the access code listed below, as well as some personal information. Please follow the directions to create your username and password.     Your access code is: [email or web address]  Expires: 2017  9:35 AM     Your access code will  in 90 days. If you need help or a new code, please call your [place] clinic or [phone].        Care EveryWhere ID     This is your Care EveryWhere ID. This could be used by other organizations to access your [place] medical records  [id number]        Your Vitals Were     Pulse Temperature Pulse Oximetry BMI (Body Mass Index)          103 97  F (36.1  C) (Oral) 98% 23.64 kg/m2         Blood Pressure from Last 3 Encounters:   17 116/83   17 96/60   17 113/89    Weight from Last 3 Encounters:   17 175 lb (79.4 kg)   17 180 lb (81.6 kg)   17 183 lb (83 kg)              Today, you had the following     No orders found for display       Primary Care Provider Office Phone # Fax #    Karson Mahmood [phone]       [address] [facility] [address]        Equal Access to Services     ANT SALCIDO : Hadii aad ku hadasho Soomaali, waaxda luqadaha, qaybta kaalmada adeegyada, waxay idiin haynazarion ami daugherty . So [facility] [phone].    ATENCIÓN: Si habla español, tiene a bernardo disposición servicios gratuitos de asistencia lingüística. Llame al [phone].    We comply with applicable federal civil rights laws and [place] laws. We do not discriminate on the basis of race, color, national origin, age, disability sex, sexual orientation or gender identity.            Thank you!     Thank you for choosing [facility]  for your care. Our goal is always to provide you with excellent care. Hearing back from our patients is one way we can continue to improve our services. Please take a few minutes to complete the written survey that you may  receive in the mail after your visit with us. Thank you!             Your Updated Medication List - Protect others around you: Learn how to safely use, store and throw away your medicines at www.disposemymeds.org.          This list is accurate as of: 9/14/17  9:35 AM.  Always use your most recent med list.                   Brand Name Dispense Instructions for use Diagnosis    aspirin 81 MG chewable tablet      Take 81 mg by mouth        BENADRYL PO           Dronabinol 10 MG Caps      Take 5 mg by mouth two times daily        gabapentin 300 MG capsule    NEURONTIN     Take 300 mg by mouth        GAS-X PO           HYDROmorphone 2 MG tablet    DILAUDID     Take 2 mg by mouth        lidocaine-prilocaine cream    EMLA     APPLY 1 APPLICATION TOPICALLY TO THE AFFECTED AREA AS DIRECTED        loratadine 10 MG tablet    CLARITIN     Take 10 mg by mouth        melatonin 5 MG tablet      Take 10 mg by mouth        METAMUCIL PO      Take 1 packet by mouth 2 times daily        nitroGLYcerin 0.4 MG sublingual tablet    NITROSTAT    25 tablet    Place 1 tablet (0.4 mg) under the tongue every 5 minutes as needed for chest pain    Coronary artery disease without angina pectoris, unspecified vessel or lesion type, unspecified whether native or transplanted heart       pantoprazole 40 MG EC tablet    PROTONIX     Take 40 mg by mouth        polyethylene glycol powder    MIRALAX/GLYCOLAX     Take 17 g by mouth 2 times daily        prochlorperazine 10 MG tablet    COMPAZINE     Take 10 mg by mouth        SENEXON-S 8.6-50 MG per tablet   Generic drug:  senna-docusate     360 tablet    1-2 po bid prn constipation    Constipation, unspecified constipation type

## 2017-09-15 NOTE — PROGRESS NOTES
Clinic Care Coordination Contact  [facility]/Voicemail    Referral Source: Non[facility]  Clinical Data: Care Coordinator Outreach  Outreach attempted x 3.  Left message on voicemail with call back information and requested return call.  Plan: Care Coordinator mailed out care coordination introduction letter on 9-12-17. Care Coordinator will try to reach patient again in 3-5 business days.      Steve Mckeon, MSN, RN, PHN  N Clinic Care Coordinator  [facility]  Phone: [phone]  [email or web address]

## 2017-09-20 NOTE — PROGRESS NOTES
Clinic Care Coordination Contact  Care Team Conversations    The Care Coordination RN has left multiple messages and send the intro letter to the patient.  There has been no response therefore the case will be marked inactive at this time and the encounter will be closed.  The patient has the number of the Care Coordination RN who will remain available in case of a future need.    Plan:  Care Coordination to remain available for the patient to contact in the event of future needs. No follow up planned at this time.     Steve Mckeon, MSN, RN, PHN  [facility] Clinic Care Coordinator  [facility]  Phone: [phone]  [email or web address]

## 2017-10-16 NOTE — LETTER
[facility]  [address] [place]  [address]  [phone]        October 18, 2017    Yunier Hernandez  [address] [facility] [address]        Dear Yunier,    Here are your lab results that I called about.  Overall stable.     Magnesium a bit low, so you could take your leftover magnesium pills one daily.     Results for orders placed or performed in visit on 10/16/17   CBC with platelets differential   Result Value Ref Range    WBC 10.2 4.0 - 11.0 10e9/L    RBC Count 3.03 (L) 4.4 - 5.9 10e12/L    Hemoglobin 10.8 (L) 13.3 - 17.7 g/dL    Hematocrit 31.7 (L) 40.0 - 53.0 %     (H) 78 - 100 fl    MCH 35.6 (H) 26.5 - 33.0 pg    MCHC 34.1 31.5 - 36.5 g/dL    RDW 21.7 (H) 10.0 - 15.0 %    Platelet Count 73 (L) 150 - 450 10e9/L    Diff Method Automated Method     % Neutrophils 51.9 %    % Lymphocytes 38.4 %    % Monocytes 9.1 %    % Eosinophils 0.2 %    % Basophils 0.4 %    Absolute Neutrophil 5.3 1.6 - 8.3 10e9/L    Absolute Lymphocytes 3.9 0.8 - 5.3 10e9/L    Absolute Monocytes 0.9 0.0 - 1.3 10e9/L    Absolute Eosinophils 0.0 0.0 - 0.7 10e9/L    Absolute Basophils 0.0 0.0 - 0.2 10e9/L   Comprehensive metabolic panel   Result Value Ref Range    Sodium 134 133 - 144 mmol/L    Potassium 4.0 3.4 - 5.3 mmol/L    Chloride 97 94 - 109 mmol/L    Carbon Dioxide 26 20 - 32 mmol/L    Anion Gap 11 3 - 14 mmol/L    Glucose 142 (H) 70 - 99 mg/dL    Urea Nitrogen 21 7 - 30 mg/dL    Creatinine 0.75 0.66 - 1.25 mg/dL    GFR Estimate >90 >60 mL/min/1.7m2    GFR Estimate If Black >90 >60 mL/min/1.7m2    Calcium 9.4 8.5 - 10.1 mg/dL    Bilirubin Total 0.5 0.2 - 1.3 mg/dL    Albumin 3.1 (L) 3.4 - 5.0 g/dL    Protein Total 7.4 6.8 - 8.8 g/dL    Alkaline Phosphatase 114 40 - 150 U/L    ALT 72 (H) 0 - 70 U/L    AST 16 0 - 45 U/L   Magnesium   Result Value Ref Range    Magnesium 1.5 (L) 1.6 - 2.3 mg/dL       If you have any questions please call the clinic at  [phone].    Sincerely,    Karson Mahmood MD  [facility]

## 2017-10-16 NOTE — MR AVS SNAPSHOT
"              After Visit Summary   10/16/2017    Yunier Hernandez    MRN: [id number]           Patient Information     Date Of Birth          1960        Visit Information        Provider Department      10/16/2017 3:00 PM Karson Mahmood MD [facility]        Today's Diagnoses     Hypomagnesemia    -  1    Hypokalemia        Anemia, unspecified type          Care Instructions    We will send you lab results    Call with problems/ questions              Follow-ups after your visit        Who to contact     If you have questions or need follow up information about today's clinic visit or your schedule please contact [facility] directly at [phone].  Normal or non-critical lab and imaging results will be communicated to you by MyChart, letter or phone within 4 business days after the clinic has received the results. If you do not hear from us within 7 days, please contact the clinic through MyChart or phone. If you have a critical or abnormal lab result, we will notify you by phone as soon as possible.  Submit refill requests through [employer] or call your pharmacy and they will forward the refill request to us. Please allow 3 business days for your refill to be completed.          Additional Information About Your Visit        MyChart Information     [employer] lets you send messages to your doctor, view your test results, renew your prescriptions, schedule appointments and more. To sign up, go to [email or web address] . Click on \"Log in\" on the left side of the screen, which will take you to the Welcome page. Then click on \"Sign up Now\" on the right side of the page.     You will be asked to enter the access code listed below, as well as some personal information. Please follow the directions to create your username and password.     Your access code is: [email or web address]  Expires: 2017  9:35 AM     Your access code will  in 90 days. If you need help or a " new code, please call your [place] clinic or [phone].        Care EveryWhere ID     This is your Care EveryWhere ID. This could be used by other organizations to access your [place] medical records  [id number]        Your Vitals Were     Pulse Temperature Pulse Oximetry BMI (Body Mass Index)          104 97.9  F (36.6  C) (Oral) 99% 24.36 kg/m2         Blood Pressure from Last 3 Encounters:   10/16/17 116/85   09/14/17 116/83   06/19/17 96/60    Weight from Last 3 Encounters:   10/16/17 180 lb 6 oz (81.8 kg)   09/14/17 175 lb (79.4 kg)   06/19/17 180 lb (81.6 kg)              We Performed the Following     CBC with platelets differential     Comprehensive metabolic panel     Magnesium        Primary Care Provider Office Phone # Fax #    Karson Mahmood [phone]       [address] [facility] [address]        Equal Access to Services     [facility] Hospital[facility] : Hadii kenisha ku hadasho Soomaali, waaxda luqadaha, qaybta kaalmada adeegyada, waxay erwinin hayclemencia daugherty . So [facility] [phone].    ATENCIÓN: Si habla español, tiene a bernardo disposición servicios gratuitos de asistencia lingüística. Llame al [phone].    We comply with applicable federal civil rights laws and [place] laws. We do not discriminate on the basis of race, color, national origin, age, disability, sex, sexual orientation, or gender identity.            Thank you!     Thank you for choosing [facility]  for your care. Our goal is always to provide you with excellent care. Hearing back from our patients is one way we can continue to improve our services. Please take a few minutes to complete the written survey that you may receive in the mail after your visit with us. Thank you!             Your Updated Medication List - Protect others around you: Learn how to safely use, store and throw away your medicines at www.disposemymeds.org.          This list is accurate as of: 10/16/17  3:41  PM.  Always use your most recent med list.                   Brand Name Dispense Instructions for use Diagnosis    aspirin 81 MG chewable tablet      Take 81 mg by mouth        BENADRYL PO           cyanocobalamin 1000 MCG/ML injection    VITAMIN B12     Inject 1 mL into the muscle every 21 days        Dronabinol 10 MG Caps      Take 5 mg by mouth two times daily        folic acid 1 MG tablet    FOLVITE     Take 1 mg by mouth daily        gabapentin 300 MG capsule    NEURONTIN     Take 300 mg by mouth        GAS-X PO           HYDROmorphone 2 MG tablet    DILAUDID     Take 2 mg by mouth        lidocaine-prilocaine cream    EMLA     APPLY 1 APPLICATION TOPICALLY TO THE AFFECTED AREA AS DIRECTED        LIPASE CONCENTRATE- MG Caps      Take by mouth 3 times daily        loratadine 10 MG tablet    CLARITIN     Take 10 mg by mouth        melatonin 5 MG tablet      Take 10 mg by mouth        METAMUCIL PO      Take 1 packet by mouth 2 times daily        nitroGLYcerin 0.4 MG sublingual tablet    NITROSTAT    25 tablet    Place 1 tablet (0.4 mg) under the tongue every 5 minutes as needed for chest pain    Coronary artery disease without angina pectoris, unspecified vessel or lesion type, unspecified whether native or transplanted heart       pantoprazole 40 MG EC tablet    PROTONIX     Take 40 mg by mouth        polyethylene glycol powder    MIRALAX/GLYCOLAX     Take 17 g by mouth 2 times daily        prochlorperazine 10 MG tablet    COMPAZINE     Take 10 mg by mouth        SENEXON-S 8.6-50 MG per tablet   Generic drug:  senna-docusate     360 tablet    1-2 po bid prn constipation    Constipation, unspecified constipation type

## 2017-10-16 NOTE — NURSING NOTE
"Chief Complaint   Patient presents with     [facility] F[facility]U     Health Maintenance       Initial /85 (BP Location: Right arm, Patient Position: Chair, Cuff Size: Adult Regular)  Pulse 104  Temp 97.9  F (36.6  C) (Oral)  Wt 180 lb 6 oz (81.8 kg)  SpO2 99%  BMI 24.36 kg/m2 Estimated body mass index is 24.36 kg/(m^2) as calculated from the following:    Height as of 4/21/17: 6' 0.15\" (1.833 m).    Weight as of this encounter: 180 lb 6 oz (81.8 kg).  Medication Reconciliation: complete   Shelly Ahumada CMA      "

## 2017-10-16 NOTE — PROGRESS NOTES
SUBJECTIVE:   Yunier Hernandez is a 57 year old male who presents to clinic today for the following health issues:           Hospital Follow-up Visit:    Hospital/Nursing Home/IP Rehab Facility: [facility]  Date of Admission: 10/09/2017  Date of Discharge: 10/13/2017  Reason(s) for Admission: sepsis            Problems taking medications regularly:  None       Medication changes since discharge: None       Problems adhering to non-medication therapy:  None     Summary of hospitalization:  CareEverywhere information obtained and reviewed  Diagnostic Tests/Treatments reviewed.  Follow up needed: none  Other Healthcare Providers Involved in Patient s Care:         None  Update since discharge: improved.      Post Discharge Medication Reconciliation: discharge medications reconciled, continue medications without change.  Plan of care communicated with patient and family     Coding guidelines for this visit:  Type of Medical   Decision Making Face-to-Face Visit       within 7 Days of discharge Face-to-Face Visit        within 14 days of discharge   Moderate Complexity 65398 94818   High Complexity 39018 57165                   Problem list and histories reviewed & adjusted, as indicated.  Additional history: as documented         Reviewed and updated as needed this visit by clinical staff  Tobacco  Allergies  Meds  Med Hx  Surg Hx  Fam Hx  Soc Hx      Reviewed and updated as needed this visit by Provider          dc 3 days ago    Biliary scan positive    Stent placed in gall bladder system    Platelets replaced in hospital    Check potassium, magnesium, cbc    Taking augmentin    They adjusted gabapentin and dexamethasone    To see the primary oncologist late    Physical Exam   Constitutional: He is oriented to person, place, and time and well-developed, well-nourished, and in no distress. No distress.   HENT:   Head: Normocephalic and atraumatic.   Eyes: Conjunctivae are normal.   Neck: Carotid bruit is not present.    Cardiovascular: Normal rate, regular rhythm, normal heart sounds and intact distal pulses.  Exam reveals no gallop and no friction rub.    No murmur heard.  Pulmonary/Chest: Effort normal and breath sounds normal. No respiratory distress. He has no wheezes. He has no rales.   Musculoskeletal: He exhibits edema (3+ bilat in ankle areas ).   Neurological: He is alert and oriented to person, place, and time.   Some tremor present bilat hands.  Foot drop right foot; can't really dorsiflex ankle       Skin: He is not diaphoretic.   Psychiatric: Mood and affect normal.     ASSESSMENT / PLAN:  (A41.9) Bacterial sepsis (H)  (primary encounter diagnosis)  Comment: patient on antibiotics; improved from hospital   Plan: follow up with ID as planned     (C34.90,  C79.89) Non-small cell carcinoma of lung metastatic to abdomen, unspecified laterality (H)  Comment: to see onc again later this week   Plan: unfortunately spreading cancer     (E83.42) Hypomagnesemia  Comment: recheck   Plan: Magnesium             (E87.6) Hypokalemia  Comment: recheck   Plan: Comprehensive metabolic panel             (D64.9) Anemia, unspecified type  Comment: platelets and hemoglobin low   Plan: CBC with platelets differential             (I25.10) Coronary artery disease without angina pectoris, unspecified vessel or lesion type, unspecified whether native or transplanted heart  Comment: no angina   Plan: monitor    Drop foot: unfortunately patient has cancer spread to spine.  Perhaps impingement on nerve causing the foot drop.  Patient will mention to onc.      I reviewed the patient's medications, allergies, medical history, family history, and social history.    Karson Mahmood MD

## 2017-10-17 NOTE — PROGRESS NOTES
Urmila Mahmood I wanted to inform you that the patients platlet count was low. A slide review and platlet est was done no immature cell seen at this time    Thank You!  Lab

## 2017-10-18 NOTE — PROGRESS NOTES
Here are your lab results that I called about.  Overall stable.    Magnesium a bit low, so you could take your leftover magnesium pills one daily.    Karson Mahmood MD    [facility] - please fax to [facility]  Thanks  Karson Mahmood MD

## 2017-10-18 NOTE — TELEPHONE ENCOUNTER
I called patient's wife with lab results.  Discussed in detail.  They could resume the leftover magnesium pills they have, one daily, then follow up with oncology as planned.    Karson Mahmood MD

## 2017-11-14 NOTE — TELEPHONE ENCOUNTER
Forms received from: [employer]   Phone number listed: n/a   Fax listed: [phone]  Date received: 11-14-17  Form description: hospice order  Once forms are completed, please return to [employer] via fax.  Is patient requesting to be contacted when forms are completed: n/a  Form placed: provider desk  Debra Rowe

## 2017-11-17 NOTE — TELEPHONE ENCOUNTER
Forms received from: [facility]   Phone number listed: n/a   Fax listed: [phone]  Date received: 11-17-17  Form description: cert of terminal illness  Once forms are completed, please return to [facility] via fax.  Is patient requesting to be contacted when forms are completed: n/a  Form placed: provider deslolis Rowe

## 2017-11-21 NOTE — TELEPHONE ENCOUNTER
Forms received from: [employer] (x2)  Phone number listed: n/a   Fax listed: [phone]  Date received: 11-21-17  Form description: POC  Once forms are completed, please return to [employer] via fax.  Is patient requesting to be contacted when forms are completed: n/a  Form placed: provider desk  Debra Rowe

## 2017-11-27 NOTE — TELEPHONE ENCOUNTER
Forms received from: [employer]   Phone number listed: n/a   Fax listed: [phone]  Date received: 11-27-17  Form description: hospice orders  Once forms are completed, please return to [employer] via fax.  Is patient requesting to be contacted when forms are completed: n/a  Form placed: provider desk  Debra Rowe

## 2019-10-14 NOTE — TELEPHONE ENCOUNTER
Panel Management Review      Patient has the following on his problem list:     Depression / Dysthymia review  PHQ-9 SCORE 11/20/2015 5/19/2016 11/11/2016   Total Score 13 1 4      Patient is due for:  None      IVD   ASA: Passed    Last LDL:    Lab Results   Component Value Date    CHOL 182 11/08/2016     Lab Results   Component Value Date    HDL 40 11/08/2016     Lab Results   Component Value Date     11/08/2016     Lab Results   Component Value Date    TRIG 125 11/08/2016        Lab Results   Component Value Date    CHOLHDLRATIO 4.2 11/17/2015        Is the patient on a Statin? NO   Is the patient on Aspirin? YES                  Medications     Salicylates    aspirin 81 MG chewable tablet          Last three blood pressure readings:  BP Readings from Last 3 Encounters:   11/11/16 119/83   02/11/16 110/84   12/31/15 137/89        Tobacco History:     History   Smoking Status     Former Smoker     Types: Cigarettes     Quit date: 12/15/2015   Smokeless Tobacco     Not on file       Hypertension   Last three blood pressure readings:  BP Readings from Last 3 Encounters:   11/11/16 119/83   02/11/16 110/84   12/31/15 137/89     Blood pressure: Passed    HTN Guidelines:  Age 18-59 BP range:  Less than 140/90  Age 60-85 with Diabetes:  Less than 140/90  Age 60-85 without Diabetes:  less than 150/90      Composite cancer screening  Chart review shows that this patient is due/due soon for the following Fecal Colorectal (FIT)  Summary:    Patient is due/failing the following:   FIT    Action needed:   Patient needs referral/order: fit test pended    Type of outreach:    Sent letter. 03/01/2017    Questions for provider review:    None                                                                                                                                    Shelly Ahumada [facility]       Chart routed to Care Team .           [phone]

## 2024-06-17 PROBLEM — Z71.89 ADVANCED DIRECTIVES, COUNSELING/DISCUSSION: Status: RESOLVED | Noted: 2017-01-01 | Resolved: 2024-06-17
